# Patient Record
Sex: FEMALE | URBAN - NONMETROPOLITAN AREA
[De-identification: names, ages, dates, MRNs, and addresses within clinical notes are randomized per-mention and may not be internally consistent; named-entity substitution may affect disease eponyms.]

---

## 2018-05-31 ENCOUNTER — NURSE TRIAGE (OUTPATIENT)
Dept: ADMINISTRATIVE | Age: 33
End: 2018-05-31

## 2018-10-03 ENCOUNTER — NURSE TRIAGE (OUTPATIENT)
Dept: ADMINISTRATIVE | Age: 33
End: 2018-10-03

## 2018-10-03 NOTE — TELEPHONE ENCOUNTER
Sudhakar states that she  stepped off a porch on Friday and twisted her R ankle , Saw the ER and had a splint on, but a little more ouchy today, was 3-4, more uncomfortable before . Asking if she should still be seen. Reason for Disposition   [1] MODERATE pain (e.g., interferes with normal activities, limping) AND [2] present > 3 days    Answer Assessment - Initial Assessment Questions  1. ONSET: \"When did the pain start? \"       Friday  2. LOCATION: \"Where is the pain located? \"       R ankle  3. PAIN: \"How bad is the pain? \"    (Scale 1-10; or mild, moderate, severe)   - MILD (1-3): doesn't interfere with normal activities    - MODERATE (4-7): interferes with normal activities (e.g., work or school) or awakens from sleep, limping    - SEVERE (8-10): excruciating pain, unable to do any normal activities, unable to walk       More than a 4 today, 5  4. WORK OR EXERCISE: \"Has there been any recent work or exercise that involved this part of the body? \"      None- fell on a step on Friday, foot is all bruised   5. CAUSE: \"What do you think is causing the ankle pain? \"      Missed step  6. OTHER SYMPTOMS: \"Do you have any other symptoms? \" (e.g., calf pain, rash, fever, swelling)      Swelling improved, but still there   7. PREGNANCY: \"Is there any chance you are pregnant? \" \"When was your last menstrual period? \"     Did not ask    Protocols used: ANKLE PAIN-ADULT-